# Patient Record
Sex: MALE | Race: WHITE | Employment: FULL TIME | URBAN - METROPOLITAN AREA
[De-identification: names, ages, dates, MRNs, and addresses within clinical notes are randomized per-mention and may not be internally consistent; named-entity substitution may affect disease eponyms.]

---

## 2017-12-14 ENCOUNTER — HOSPITAL ENCOUNTER (OUTPATIENT)
Dept: GENERAL RADIOLOGY | Facility: HOSPITAL | Age: 27
Discharge: HOME | End: 2017-12-14
Attending: PREVENTIVE MEDICINE
Payer: OTHER MISCELLANEOUS

## 2017-12-14 ENCOUNTER — OFFICE VISIT (OUTPATIENT)
Dept: OTHER | Facility: HOSPITAL | Age: 27
End: 2017-12-14
Attending: PREVENTIVE MEDICINE
Payer: OTHER MISCELLANEOUS

## 2017-12-14 DIAGNOSIS — S68.111A TRAUMATIC AMPUTATION OF LEFT INDEX FINGER: Primary | ICD-10-CM

## 2017-12-14 PROCEDURE — 73140 X-RAY EXAM OF FINGER(S): CPT | Performed by: PREVENTIVE MEDICINE

## 2018-12-28 PROBLEM — K82.4 GALLBLADDER POLYP: Status: ACTIVE | Noted: 2018-12-28

## 2021-04-01 ENCOUNTER — HOSPITAL ENCOUNTER (EMERGENCY)
Facility: HOSPITAL | Age: 31
Discharge: HOME | End: 2021-04-01
Attending: EMERGENCY MEDICINE
Payer: OTHER MISCELLANEOUS

## 2021-04-01 VITALS
HEIGHT: 69 IN | TEMPERATURE: 97 F | DIASTOLIC BLOOD PRESSURE: 98 MMHG | SYSTOLIC BLOOD PRESSURE: 151 MMHG | RESPIRATION RATE: 16 BRPM | BODY MASS INDEX: 23.7 KG/M2 | OXYGEN SATURATION: 100 % | HEART RATE: 90 BPM | WEIGHT: 160 LBS

## 2021-04-01 DIAGNOSIS — S61.217A LACERATION OF LEFT LITTLE FINGER WITHOUT FOREIGN BODY WITHOUT DAMAGE TO NAIL, INITIAL ENCOUNTER: Primary | ICD-10-CM

## 2021-04-01 PROCEDURE — 99282 EMERGENCY DEPT VISIT SF MDM: CPT

## 2021-04-01 PROCEDURE — 12002 RPR S/N/AX/GEN/TRNK2.6-7.5CM: CPT

## 2021-04-01 NOTE — ED PROVIDER NOTES
Patient Seen in: [facility] Emergency Department      History   Patient presents with:  Laceration/Abrasion    Stated Complaint: cut finger on metal    HPI/Subjective:   HPI    Patient is a 23-year-old who cut his left fifth finger on a piece of she are brisk in all 4 extremities. Normal capillary refill. SKIN: Well perfused, without cyanosis. No rashes. NEUROLOGIC: Cranial nerves II through XII are intact moving all extremities normally. No focal deficits visualized.        ED Course   Labs Revie

## 2021-04-11 ENCOUNTER — HOSPITAL ENCOUNTER (EMERGENCY)
Facility: HOSPITAL | Age: 31
Discharge: HOME | End: 2021-04-11
Attending: EMERGENCY MEDICINE
Payer: OTHER MISCELLANEOUS

## 2021-04-11 VITALS
OXYGEN SATURATION: 100 % | RESPIRATION RATE: 18 BRPM | SYSTOLIC BLOOD PRESSURE: 159 MMHG | WEIGHT: 160.06 LBS | TEMPERATURE: 99 F | BODY MASS INDEX: 24 KG/M2 | DIASTOLIC BLOOD PRESSURE: 98 MMHG | HEART RATE: 92 BPM

## 2021-04-11 DIAGNOSIS — Z48.02 ENCOUNTER FOR REMOVAL OF SUTURES: Primary | ICD-10-CM

## 2021-04-11 NOTE — ED PROVIDER NOTES
Patient Seen in: [facility] Emergency Department      History   Patient presents with:  Cristina Vasquez    Stated Complaint: wound check and suture removal    HPI/Subjective:   HPI    Patient had removed all except one of the sutures by himself symptoms worsen, increased concerns          Medications Prescribed:  Current Discharge Medication List

## 2021-04-11 NOTE — ED INITIAL ASSESSMENT (HPI)
Reports here for suture removal placed in L 5th digit, placed 10 days ago. Reports he took out 9 sutures himself, x1 suture remains. Pt reports it appeared to \"open\" a bit so he stopped removing it.